# Patient Record
Sex: FEMALE | Race: WHITE | NOT HISPANIC OR LATINO
[De-identification: names, ages, dates, MRNs, and addresses within clinical notes are randomized per-mention and may not be internally consistent; named-entity substitution may affect disease eponyms.]

---
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
## 2019-08-13 ENCOUNTER — APPOINTMENT (OUTPATIENT)
Dept: INTERNAL MEDICINE | Facility: CLINIC | Age: 19
End: 2019-08-13

## 2019-08-13 ENCOUNTER — APPOINTMENT (OUTPATIENT)
Dept: INTERNAL MEDICINE | Facility: CLINIC | Age: 19
End: 2019-08-13
Payer: COMMERCIAL

## 2019-08-13 VITALS
HEIGHT: 65 IN | WEIGHT: 131 LBS | SYSTOLIC BLOOD PRESSURE: 115 MMHG | DIASTOLIC BLOOD PRESSURE: 62 MMHG | OXYGEN SATURATION: 99 % | TEMPERATURE: 98.8 F | HEART RATE: 88 BPM | BODY MASS INDEX: 21.83 KG/M2

## 2019-08-13 DIAGNOSIS — R05 COUGH: ICD-10-CM

## 2019-08-13 DIAGNOSIS — Z00.00 ENCOUNTER FOR GENERAL ADULT MEDICAL EXAMINATION W/OUT ABNORMAL FINDINGS: ICD-10-CM

## 2019-08-13 DIAGNOSIS — R42 DIZZINESS AND GIDDINESS: ICD-10-CM

## 2019-08-13 DIAGNOSIS — E53.8 DEFICIENCY OF OTHER SPECIFIED B GROUP VITAMINS: ICD-10-CM

## 2019-08-13 PROCEDURE — 99395 PREV VISIT EST AGE 18-39: CPT

## 2019-08-13 RX ORDER — CIPROFLOXACIN HYDROCHLORIDE 500 MG/1
500 TABLET, FILM COATED ORAL
Qty: 14 | Refills: 0 | Status: DISCONTINUED | COMMUNITY
End: 2019-08-13

## 2019-08-13 NOTE — HISTORY OF PRESENT ILLNESS
[FreeTextEntry1] : Patient comes in for an annual physical exam.\par  [de-identified] : Patient is a 18-year-old female history of vitamin B12 deficiency comes in for annual exam. She notes having a dry cough since April of this year. Her cough in April was productive of mucus and she went to [facility]. urgent care and was treated with antibiotics for 10 days. Continue to have a dry cough and about 2 weeks ago started having upper respiratory infection symptoms as well. Therefore she went back to urgent care and was treated with steroids which helped with her symptoms. She denies any fevers or chills, shortness of breath, chest pain, postnasal drip. She does feel she has seasonal allergies as well. She is not use any regular allergy tablets or nasal sprays.\par She does have a history of on and off dizziness for the past 2 years. In 2017 she had an EKG and 2-D echo workup done by cardiology  which was negative. Recently in the last 3 months she felt lightheaded on some occasions including while cooking, during the shower as well as at a theme park. No loss of consciousness, palpitations, chest pain or blurry vision.

## 2019-08-13 NOTE — ASSESSMENT
[FreeTextEntry1] : 1.health maintenance: Discussed fasting blood work, followup with GYN for Pap smear.\par \par 2.cough: Likely related to seasonal allergies. Discussed starting a loratadine once daily.\par \par 3.history of dizziness: Patient has had negative cardiac workup in the past. Discussed checking blood work and if symptoms continue will need neurological evaluation.\par \par 4.vitamin B12 deficiency: Continue on B complex 2 tablets daily.

## 2019-08-13 NOTE — PAST MEDICAL HISTORY
[Menstruating] : menstruating [Menarche Age ____] : age at menarche was [unfilled] [Approximately ___] : the LMP was approximately [unfilled] [Regular Cycle Intervals] : have been regular

## 2019-08-13 NOTE — HEALTH RISK ASSESSMENT
[Monthly or less (1 pt)] : Monthly or less (1 point) [Yes] : Yes [1 or 2 (0 pts)] : 1 or 2 (0 points) [Never (0 pts)] : Never (0 points) [No] : In the past 12 months have you used drugs other than those required for medical reasons? No [0] : 2) Feeling down, depressed, or hopeless: Not at all (0) [Patient reported PAP Smear was normal] : Patient reported PAP Smear was normal [Student] : student [Carbon Monoxide Detector] : carbon monoxide detector [Smoke Detector] : smoke detector [Seat Belt] :  uses seat belt [Sunscreen] : uses sunscreen [] : No [[id number]] : 0 [Single] : single [Guns at Home] : no guns at home [PapSmearDate] : 06/2019

## 2021-02-12 ENCOUNTER — APPOINTMENT (OUTPATIENT)
Dept: DERMATOLOGY | Facility: CLINIC | Age: 21
End: 2021-02-12
Payer: COMMERCIAL

## 2021-02-12 ENCOUNTER — NON-APPOINTMENT (OUTPATIENT)
Age: 21
End: 2021-02-12

## 2021-02-12 DIAGNOSIS — D22.9 MELANOCYTIC NEVI, UNSPECIFIED: ICD-10-CM

## 2021-02-12 DIAGNOSIS — L81.4 OTHER MELANIN HYPERPIGMENTATION: ICD-10-CM

## 2021-02-12 DIAGNOSIS — D48.9 NEOPLASM OF UNCERTAIN BEHAVIOR, UNSPECIFIED: ICD-10-CM

## 2021-02-12 DIAGNOSIS — B07.8 OTHER VIRAL WARTS: ICD-10-CM

## 2021-02-12 PROCEDURE — 99243 OFF/OP CNSLTJ NEW/EST LOW 30: CPT | Mod: 25

## 2021-02-12 PROCEDURE — 99072 ADDL SUPL MATRL&STAF TM PHE: CPT

## 2021-02-12 PROCEDURE — 11102 TANGNTL BX SKIN SINGLE LES: CPT | Mod: 59

## 2021-02-12 PROCEDURE — 17110 DESTRUCTION B9 LES UP TO 14: CPT | Mod: 59

## 2021-02-12 NOTE — ASSESSMENT
[FreeTextEntry1] : 1) benign findings as above- education\par benign appearing lentigo on lower lip\par \par 2) Verruca vulgaris-\par -Education\par -LN2 x2 cycles; SED, undertstands risk of redness, blistering, scarring, and incomplete treatment\par \par 3) Shave bx location left palm\par diagnosis: r/o VV; discussed there may not be enough tissue for dx\par  \par Shave biopsy performed today over above location, risks and benefits discussed including incomplete removal, not enough tissue for diagnosis scarring and infection, informed consent obtained,cleaned with alcohol, hemostasis obtained with AlCl tolerated well, wound care reviewed, specimen sent to pathology.\par \par \par

## 2021-02-12 NOTE — PHYSICAL EXAM
[FreeTextEntry3] : AAOx3, pleasant, NAD, no visual lymphadenopathy\par hair, scalp, face, nose, eyelids, ears, lips, oropharynx, neck, chest, abdomen, back, right arm, left arm, nails, and hands examined with all normal findings,\par pertinent findings include:\par \par left hand with 3 keratotic papule with black dots on surface (1 on left 1st digit, 1 on 4th digit)\par lentigine on lower lip

## 2021-02-12 NOTE — CONSULT LETTER
[Dear  ___] : Dear  [unfilled], [Consult Letter:] : I had the pleasure of evaluating your patient, [unfilled]. [Please see my note below.] : Please see my note below. [Consult Closing:] : Thank you very much for allowing me to participate in the care of this patient.  If you have any questions, please do not hesitate to contact me. [Sincerely,] : Sincerely, [FreeTextEntry3] : Best,\par \par Joel Choi MD, FAAD\par Department of Dermatology [facility]\par \par

## 2021-02-12 NOTE — HISTORY OF PRESENT ILLNESS
[FreeTextEntry1] : warts, spot on lip [de-identified] : 20 year old female with spot on lip. started after sun exposure. not enlarging.\par also with growths on her hands. no tx tried.

## 2021-02-19 ENCOUNTER — APPOINTMENT (OUTPATIENT)
Dept: INTERNAL MEDICINE | Facility: CLINIC | Age: 21
End: 2021-02-19
Payer: COMMERCIAL

## 2021-02-19 ENCOUNTER — NON-APPOINTMENT (OUTPATIENT)
Age: 21
End: 2021-02-19

## 2021-02-19 ENCOUNTER — APPOINTMENT (OUTPATIENT)
Dept: RADIOLOGY | Facility: CLINIC | Age: 21
End: 2021-02-19
Payer: COMMERCIAL

## 2021-02-19 ENCOUNTER — OUTPATIENT (OUTPATIENT)
Dept: OUTPATIENT SERVICES | Facility: HOSPITAL | Age: 21
LOS: 1 days | End: 2021-02-19
Payer: COMMERCIAL

## 2021-02-19 VITALS
HEART RATE: 100 BPM | TEMPERATURE: 98 F | WEIGHT: 126 LBS | SYSTOLIC BLOOD PRESSURE: 120 MMHG | RESPIRATION RATE: 16 BRPM | DIASTOLIC BLOOD PRESSURE: 80 MMHG | OXYGEN SATURATION: 98 % | BODY MASS INDEX: 20.99 KG/M2 | HEIGHT: 65 IN

## 2021-02-19 DIAGNOSIS — N94.6 DYSMENORRHEA, UNSPECIFIED: ICD-10-CM

## 2021-02-19 DIAGNOSIS — M54.5 LOW BACK PAIN: ICD-10-CM

## 2021-02-19 DIAGNOSIS — M54.6 PAIN IN THORACIC SPINE: ICD-10-CM

## 2021-02-19 LAB — CORE LAB BIOPSY: NORMAL

## 2021-02-19 PROCEDURE — 72100 X-RAY EXAM L-S SPINE 2/3 VWS: CPT

## 2021-02-19 PROCEDURE — 72070 X-RAY EXAM THORAC SPINE 2VWS: CPT | Mod: 26

## 2021-02-19 PROCEDURE — 99214 OFFICE O/P EST MOD 30 MIN: CPT

## 2021-02-19 PROCEDURE — 72100 X-RAY EXAM L-S SPINE 2/3 VWS: CPT | Mod: 26

## 2021-02-19 PROCEDURE — 72070 X-RAY EXAM THORAC SPINE 2VWS: CPT

## 2021-02-19 PROCEDURE — 99072 ADDL SUPL MATRL&STAF TM PHE: CPT

## 2021-02-19 RX ORDER — TIZANIDINE 2 MG/1
2 TABLET ORAL
Qty: 20 | Refills: 0 | Status: ACTIVE | COMMUNITY
Start: 2021-02-19 | End: 1900-01-01

## 2021-02-19 NOTE — ASSESSMENT
[FreeTextEntry1] : 1.thoracic and lumbar back pain: With no symptoms of radiculopathy. Obtain lumbar and thoracic x-rays at this time. Advise patient to let us know who her spinal surgeon wasn't we can obtain records from previous. She is unsure if she had x-rays or MRIs in the past. Start on tizanidine 2 mg as needed at bedtime, naproxen 500 mg b.i.d. for pain relief. Continue with heat and ice therapy. Will likely need physical therapy. Went over instructions and side effects of medication.\par \par 2.menstrual cramps: Advise patient to followup with GYN regarding changing her birth control to IUD. Discussed taking Midol or NSAIDs for pain relief.

## 2021-02-19 NOTE — HISTORY OF PRESENT ILLNESS
[FreeTextEntry8] : Ms. YONY ROBLES is a 20 year F who comes in for an acute visit.\par Patient is a 20-year-old female with history of herniated L5 comes in with complaints of back pain. She states about 3 weeks ago she noted left mid thoracic back pain that started suddenly. She did not lift any heavy objects or straining exercise recently. She did try a heating pads as well as CBD gummies that only gave her mild relief. She did take Advil 2 tablets on one occasion before bed. She notes the pain is pinching in nature and recently has started to radiate down to her lower back bilaterally. The pain is on a daily basis and is worse upon sitting and lying down.\par In 2015 when she was doing gymnastics in high school she had lower back pain and imaging showed L5 lumbar disc herniation. She did do physical therapy at that time and saw a spine surgeon.\par Patient also notes premenstrual cramps that are very painful on the first day of her menses. She has broken her GYN and they did change her oral contraceptive but this did not help with her symptoms. She does not take medication for the cramps in the lower abdomen or in the back.\par Patient denies any cp, sob,abdominal pain, nausea, vomiting, palpitations, fever, chills, constipation, diarrhea.\par

## 2021-02-20 ENCOUNTER — TRANSCRIPTION ENCOUNTER (OUTPATIENT)
Age: 21
End: 2021-02-20

## 2021-03-08 ENCOUNTER — APPOINTMENT (OUTPATIENT)
Dept: DERMATOLOGY | Facility: CLINIC | Age: 21
End: 2021-03-08

## 2021-03-16 RX ORDER — NAPROXEN 500 MG/1
500 TABLET ORAL
Qty: 60 | Refills: 0 | Status: ACTIVE | COMMUNITY
Start: 2021-02-19 | End: 1900-01-01

## 2022-06-27 RX ORDER — NORGESTIMATE AND ETHINYL ESTRADIOL 7DAYSX3 LO
KIT ORAL
COMMUNITY